# Patient Record
Sex: FEMALE | Race: WHITE | NOT HISPANIC OR LATINO | URBAN - NONMETROPOLITAN AREA
[De-identification: names, ages, dates, MRNs, and addresses within clinical notes are randomized per-mention and may not be internally consistent; named-entity substitution may affect disease eponyms.]

---

## 2023-10-11 DIAGNOSIS — J45.20 MILD INTERMITTENT ASTHMA WITHOUT COMPLICATION (HHS-HCC): Primary | ICD-10-CM

## 2023-10-11 RX ORDER — ALBUTEROL SULFATE 90 UG/1
2 AEROSOL, METERED RESPIRATORY (INHALATION) EVERY 4 HOURS PRN
Qty: 6.7 G | Refills: 0 | Status: SHIPPED | OUTPATIENT
Start: 2023-10-11 | End: 2023-11-30

## 2023-11-09 PROBLEM — I82.409 DEEP VENOUS THROMBOSIS (MULTI): Status: ACTIVE | Noted: 2023-11-09

## 2023-11-09 PROBLEM — M62.82 RHABDOMYOLYSIS: Status: ACTIVE | Noted: 2023-11-09

## 2023-11-09 PROBLEM — R78.81 BACTEREMIA DUE TO METHICILLIN RESISTANT STAPHYLOCOCCUS AUREUS: Status: ACTIVE | Noted: 2023-11-09

## 2023-11-09 PROBLEM — G25.3 MYOCLONUS: Status: ACTIVE | Noted: 2023-11-09

## 2023-11-09 PROBLEM — K21.9 GASTROESOPHAGEAL REFLUX DISEASE: Status: ACTIVE | Noted: 2023-11-09

## 2023-11-09 PROBLEM — B95.62 BACTEREMIA DUE TO METHICILLIN RESISTANT STAPHYLOCOCCUS AUREUS: Status: ACTIVE | Noted: 2023-11-09

## 2023-11-09 PROBLEM — J15.212 MRSA PNEUMONIA (MULTI): Status: ACTIVE | Noted: 2023-11-09

## 2023-11-09 PROBLEM — K22.70 BARRETT ESOPHAGUS: Status: ACTIVE | Noted: 2023-11-09

## 2023-11-09 PROBLEM — R41.89 COGNITIVE IMPAIRMENT: Status: ACTIVE | Noted: 2023-11-09

## 2023-11-09 PROBLEM — E55.9 VITAMIN D DEFICIENCY, UNSPECIFIED: Status: ACTIVE | Noted: 2023-11-09

## 2023-11-09 PROBLEM — G57.93 NEUROPATHY OF BOTH FEET: Status: ACTIVE | Noted: 2023-11-09

## 2023-11-09 RX ORDER — DULOXETIN HYDROCHLORIDE 60 MG/1
CAPSULE, DELAYED RELEASE ORAL
COMMUNITY
Start: 2023-10-11 | End: 2023-11-10 | Stop reason: ALTCHOICE

## 2023-11-09 RX ORDER — MIRTAZAPINE 7.5 MG/1
TABLET, FILM COATED ORAL
COMMUNITY
Start: 2023-10-02 | End: 2023-11-10 | Stop reason: ALTCHOICE

## 2023-11-09 RX ORDER — DIVALPROEX SODIUM 250 MG/1
1 TABLET, FILM COATED, EXTENDED RELEASE ORAL DAILY
COMMUNITY
End: 2023-11-10 | Stop reason: ALTCHOICE

## 2023-11-09 RX ORDER — PANTOPRAZOLE SODIUM 40 MG/1
1 TABLET, DELAYED RELEASE ORAL DAILY
COMMUNITY

## 2023-11-09 RX ORDER — TEMAZEPAM 7.5 MG/1
1 CAPSULE ORAL DAILY PRN
COMMUNITY
Start: 2023-09-01 | End: 2023-11-10 | Stop reason: ALTCHOICE

## 2023-11-09 RX ORDER — PRAZOSIN HYDROCHLORIDE 1 MG/1
1 CAPSULE ORAL NIGHTLY
COMMUNITY
Start: 2023-07-29 | End: 2023-11-10

## 2023-11-09 RX ORDER — OLANZAPINE 5 MG/1
2 TABLET ORAL NIGHTLY
COMMUNITY
Start: 2023-09-20 | End: 2023-12-06 | Stop reason: ALTCHOICE

## 2023-11-09 RX ORDER — DULOXETIN HYDROCHLORIDE 30 MG/1
1 CAPSULE, DELAYED RELEASE ORAL DAILY
COMMUNITY
Start: 2023-08-21 | End: 2023-12-06 | Stop reason: ALTCHOICE

## 2023-11-09 RX ORDER — DICYCLOMINE HYDROCHLORIDE 10 MG/1
1 CAPSULE ORAL 3 TIMES DAILY
COMMUNITY
Start: 2022-04-04 | End: 2023-12-18 | Stop reason: WASHOUT

## 2023-11-09 RX ORDER — LANOLIN ALCOHOL/MO/W.PET/CERES
1 CREAM (GRAM) TOPICAL DAILY
COMMUNITY
Start: 2023-05-05 | End: 2023-12-06 | Stop reason: ALTCHOICE

## 2023-11-09 RX ORDER — TEMAZEPAM 15 MG/1
1 CAPSULE ORAL AS NEEDED
COMMUNITY
Start: 2023-01-27 | End: 2023-12-06 | Stop reason: ALTCHOICE

## 2023-11-09 RX ORDER — VITAMIN B COMPLEX
1200 CAPSULE ORAL SEE ADMIN INSTRUCTIONS
COMMUNITY
Start: 2022-09-12 | End: 2023-11-10 | Stop reason: WASHOUT

## 2023-11-10 ENCOUNTER — OFFICE VISIT (OUTPATIENT)
Dept: PRIMARY CARE | Facility: CLINIC | Age: 53
End: 2023-11-10
Payer: COMMERCIAL

## 2023-11-10 VITALS
HEART RATE: 84 BPM | BODY MASS INDEX: 26.01 KG/M2 | WEIGHT: 163.6 LBS | OXYGEN SATURATION: 97 % | SYSTOLIC BLOOD PRESSURE: 120 MMHG | DIASTOLIC BLOOD PRESSURE: 74 MMHG

## 2023-11-10 DIAGNOSIS — Z79.899 OTHER LONG TERM (CURRENT) DRUG THERAPY: ICD-10-CM

## 2023-11-10 DIAGNOSIS — R63.5 WEIGHT GAIN: ICD-10-CM

## 2023-11-10 DIAGNOSIS — R63.2 POLYPHAGIA: Primary | ICD-10-CM

## 2023-11-10 DIAGNOSIS — D17.21 LIPOMA OF RIGHT UPPER EXTREMITY: ICD-10-CM

## 2023-11-10 LAB — POC HEMOGLOBIN A1C: 5.4 % (ref 4.2–6.5)

## 2023-11-10 PROCEDURE — 99214 OFFICE O/P EST MOD 30 MIN: CPT | Performed by: FAMILY MEDICINE

## 2023-11-10 RX ORDER — MELATONIN 5 MG
5 CAPSULE ORAL EVERY 24 HOURS
COMMUNITY

## 2023-11-10 ASSESSMENT — ENCOUNTER SYMPTOMS
LOSS OF SENSATION IN FEET: 0
OCCASIONAL FEELINGS OF UNSTEADINESS: 0
DEPRESSION: 1

## 2023-11-10 ASSESSMENT — PATIENT HEALTH QUESTIONNAIRE - PHQ9
1. LITTLE INTEREST OR PLEASURE IN DOING THINGS: NEARLY EVERY DAY
SUM OF ALL RESPONSES TO PHQ9 QUESTIONS 1 AND 2: 6
6. FEELING BAD ABOUT YOURSELF - OR THAT YOU ARE A FAILURE OR HAVE LET YOURSELF OR YOUR FAMILY DOWN: SEVERAL DAYS
9. THOUGHTS THAT YOU WOULD BE BETTER OFF DEAD, OR OF HURTING YOURSELF: NOT AT ALL
7. TROUBLE CONCENTRATING ON THINGS, SUCH AS READING THE NEWSPAPER OR WATCHING TELEVISION: NEARLY EVERY DAY
8. MOVING OR SPEAKING SO SLOWLY THAT OTHER PEOPLE COULD HAVE NOTICED. OR THE OPPOSITE, BEING SO FIGETY OR RESTLESS THAT YOU HAVE BEEN MOVING AROUND A LOT MORE THAN USUAL: NOT AT ALL
10. IF YOU CHECKED OFF ANY PROBLEMS, HOW DIFFICULT HAVE THESE PROBLEMS MADE IT FOR YOU TO DO YOUR WORK, TAKE CARE OF THINGS AT HOME, OR GET ALONG WITH OTHER PEOPLE: SOMEWHAT DIFFICULT
5. POOR APPETITE OR OVEREATING: SEVERAL DAYS
4. FEELING TIRED OR HAVING LITTLE ENERGY: SEVERAL DAYS
SUM OF ALL RESPONSES TO PHQ QUESTIONS 1-9: 15
2. FEELING DOWN, DEPRESSED OR HOPELESS: NEARLY EVERY DAY
3. TROUBLE FALLING OR STAYING ASLEEP OR SLEEPING TOO MUCH: NEARLY EVERY DAY

## 2023-11-10 ASSESSMENT — PAIN SCALES - GENERAL: PAINLEVEL: 0-NO PAIN

## 2023-11-10 NOTE — PROGRESS NOTES
Subjective   Patient ID: Caryn Streeter is a 53 y.o. female who presents for Bumps on arms.    With lumps on her arms that she worries about.  Started mostly on her right side but she found some on her left side as well.'s been going on over the last few months.  Increased hunger now... Used to avoid sugar, but can't seem to stop. On zyprexa  now and tamazepam.  She has told her psychiatrist about this but they have kept her on this.  She is gaining weight where she never has had a problem with that before.  She never had a problem with cravings for sugar and other foods but does now.  Not sure what to do with that.  She has had friends have who have been on GLP-1 medications and she wonders if she should start 1 of those, especially if she was diabetic.    He states the voices in her head are better on the medication.  They are still there but she is able to handle it better.         Review of Systems    Objective   /74   Pulse 84   Wt 74.2 kg (163 lb 9.6 oz)   SpO2 97%   BMI 26.01 kg/m²     Physical Exam  Constitutional:       General: She is not in acute distress.     Appearance: Normal appearance. She is not ill-appearing.   Abdominal:      General: Abdomen is flat. There is distension (Abdomen is larger than it has been.).      Palpations: Abdomen is soft.   Skin:     General: Skin is warm and dry.      Comments: She has a few nodules beneath her skin and seems to be lipomas or ganglion cyst.  She has 1 on her left wrist on the radial anterior side and then 1 up to her forearm as well.  I do not feel any left side but she states she is felt in there before.   Neurological:      Mental Status: She is alert.   Psychiatric:         Mood and Affect: Mood normal.         Assessment/Plan   Diagnoses and all orders for this visit:  Polyphagia  -     POCT glycosylated hemoglobin (Hb A1C) manually resulted  Weight gain  -     POCT glycosylated hemoglobin (Hb A1C) manually resulted  Lipoma of right upper  extremity  -     POCT glycosylated hemoglobin (Hb A1C) manually resulted  Other long term (current) drug therapy  -     POCT glycosylated hemoglobin (Hb A1C) manually resulted    This is likely a lipoma or ganglion cyst that she has.  But we did macario this from a TCM standpoint.  Her energy is stuck.  Is one of the reason she is gaining weight as well.  I did offer her an acupuncture treatment today we will see how she does over the weekend.  In the meantime, however, I want her to call her psychiatrist and change her off of the Zyprexa.  Since has been on that she has had these hunger issues.  I do not recommend any GLP-1 medications but rather stopping the thing that is precipitated this.    She will call me on Monday and let me know how she is done.

## 2023-11-15 ENCOUNTER — TELEPHONE (OUTPATIENT)
Dept: PRIMARY CARE | Facility: CLINIC | Age: 53
End: 2023-11-15
Payer: COMMERCIAL

## 2023-11-15 NOTE — TELEPHONE ENCOUNTER
Her psych doc stopped her olanzepine.  They are going to start her on asenapine maleate. She wants to know if this is okay for her to take. Last appt with you 11/10/23 When calling back call home phone [phone] and okay to leave message

## 2023-11-30 DIAGNOSIS — J45.20 MILD INTERMITTENT ASTHMA WITHOUT COMPLICATION (HHS-HCC): ICD-10-CM

## 2023-11-30 RX ORDER — ALBUTEROL SULFATE 90 UG/1
2 AEROSOL, METERED RESPIRATORY (INHALATION) EVERY 4 HOURS PRN
Qty: 6.7 G | Refills: 0 | Status: SHIPPED | OUTPATIENT
Start: 2023-11-30

## 2023-12-06 ENCOUNTER — OFFICE VISIT (OUTPATIENT)
Dept: PRIMARY CARE | Facility: CLINIC | Age: 53
End: 2023-12-06
Payer: COMMERCIAL

## 2023-12-06 VITALS
BODY MASS INDEX: 25.5 KG/M2 | WEIGHT: 160.4 LBS | HEART RATE: 83 BPM | SYSTOLIC BLOOD PRESSURE: 118 MMHG | OXYGEN SATURATION: 97 % | DIASTOLIC BLOOD PRESSURE: 72 MMHG

## 2023-12-06 DIAGNOSIS — R63.5 WEIGHT GAIN: ICD-10-CM

## 2023-12-06 DIAGNOSIS — R44.0 HEARING VOICES: Primary | ICD-10-CM

## 2023-12-06 PROCEDURE — 99213 OFFICE O/P EST LOW 20 MIN: CPT | Performed by: FAMILY MEDICINE

## 2023-12-06 RX ORDER — TEMAZEPAM 7.5 MG/1
7.5 CAPSULE ORAL NIGHTLY PRN
COMMUNITY
End: 2023-12-18 | Stop reason: SDUPTHER

## 2023-12-06 ASSESSMENT — PATIENT HEALTH QUESTIONNAIRE - PHQ9
2. FEELING DOWN, DEPRESSED OR HOPELESS: NOT AT ALL
1. LITTLE INTEREST OR PLEASURE IN DOING THINGS: NOT AT ALL
SUM OF ALL RESPONSES TO PHQ9 QUESTIONS 1 AND 2: 0

## 2023-12-06 ASSESSMENT — ENCOUNTER SYMPTOMS
LOSS OF SENSATION IN FEET: 0
DEPRESSION: 0
OCCASIONAL FEELINGS OF UNSTEADINESS: 0

## 2023-12-06 ASSESSMENT — PAIN SCALES - GENERAL: PAINLEVEL: 0-NO PAIN

## 2023-12-06 NOTE — PROGRESS NOTES
Subjective   Patient ID: Caryn Streeter is a 53 y.o. female who presents for Psych Meds.    She presents today to discuss treatments with me.  She states she had a bad reaction to latest psychiatric medication and she has been offered ECT treatment.  She wonders if that something she should do and just wants my opinion.    She gained so much weight on these other medications.  She is having some trouble losing it.  She also wants quit smoking.  She has a great desire to do so but is has had some trouble.         Review of Systems    Objective   /72   Pulse 83   Wt 72.8 kg (160 lb 6.4 oz)   SpO2 97%   BMI 25.50 kg/m²     Physical Exam  Constitutional:       General: She is not in acute distress.     Appearance: She is not ill-appearing.   Pulmonary:      Effort: Pulmonary effort is normal.   Neurological:      Mental Status: She is alert.   Psychiatric:         Mood and Affect: Mood normal.         Thought Content: Thought content normal.         Assessment/Plan   Diagnoses and all orders for this visit:  Hearing voices  Weight gain  I think that she certainly can go forward with the ECT treatment, specially since pharmaceuticals have not really served her well with the weight gain and other side effects and really are not working very well for her.  I do not know how well they will do for the voices but can help with anxiety and depression.    I will contact our pharmacist to see if Wegovy or Ozempic are covered for her, which could help her with the weight loss.

## 2023-12-06 NOTE — PATIENT INSTRUCTIONS
I think it's okay to do the ECT.     I'll check into the Wygovy and see if that is covered for weight loss.

## 2023-12-07 DIAGNOSIS — R63.5 WEIGHT GAIN: Primary | ICD-10-CM

## 2023-12-07 DIAGNOSIS — R63.2 POLYPHAGIA: ICD-10-CM

## 2023-12-18 ENCOUNTER — OFFICE VISIT (OUTPATIENT)
Dept: PRIMARY CARE | Facility: CLINIC | Age: 53
End: 2023-12-18
Payer: COMMERCIAL

## 2023-12-18 VITALS
BODY MASS INDEX: 25.39 KG/M2 | HEART RATE: 60 BPM | SYSTOLIC BLOOD PRESSURE: 120 MMHG | OXYGEN SATURATION: 98 % | DIASTOLIC BLOOD PRESSURE: 66 MMHG | HEIGHT: 66 IN | WEIGHT: 158 LBS

## 2023-12-18 DIAGNOSIS — R31.21 ASYMPTOMATIC MICROSCOPIC HEMATURIA: ICD-10-CM

## 2023-12-18 DIAGNOSIS — B37.9 YEAST INFECTION: ICD-10-CM

## 2023-12-18 DIAGNOSIS — R44.0 CONTINUOUS AUDITORY HALLUCINATIONS: Primary | ICD-10-CM

## 2023-12-18 PROBLEM — F17.200 NICOTINE USE DISORDER: Status: ACTIVE | Noted: 2021-07-29

## 2023-12-18 PROBLEM — K05.30 CHRONIC PERIODONTITIS: Status: ACTIVE | Noted: 2020-07-20

## 2023-12-18 PROBLEM — F31.5 BIPOLAR I DISORDER, MOST RECENT EPISODE (OR CURRENT) DEPRESSED, SEVERE, SPECIFIED AS WITH PSYCHOTIC BEHAVIOR (MULTI): Status: ACTIVE | Noted: 2023-05-03

## 2023-12-18 PROBLEM — F43.12 CHRONIC POST-TRAUMATIC STRESS DISORDER (PTSD): Status: ACTIVE | Noted: 2023-05-03

## 2023-12-18 PROBLEM — R41.3 MEMORY LOSS: Status: ACTIVE | Noted: 2021-10-25

## 2023-12-18 PROBLEM — F31.9 BIPOLAR 1 DISORDER (MULTI): Status: ACTIVE | Noted: 2023-12-18

## 2023-12-18 PROBLEM — F19.94 SUBSTANCE INDUCED MOOD DISORDER (MULTI): Status: ACTIVE | Noted: 2023-05-03

## 2023-12-18 PROBLEM — N95.1 PERIMENOPAUSE: Status: ACTIVE | Noted: 2023-12-18

## 2023-12-18 LAB
POC APPEARANCE, URINE: CLEAR
POC BILIRUBIN, URINE: NEGATIVE
POC BLOOD, URINE: ABNORMAL
POC COLOR, URINE: ABNORMAL
POC GLUCOSE, URINE: NEGATIVE MG/DL
POC KETONES, URINE: NEGATIVE MG/DL
POC LEUKOCYTES, URINE: NEGATIVE
POC NITRITE,URINE: NEGATIVE
POC PH, URINE: 7 PH
POC PROTEIN, URINE: NEGATIVE MG/DL
POC SPECIFIC GRAVITY, URINE: 1.02
POC UROBILINOGEN, URINE: 0.2 EU/DL

## 2023-12-18 PROCEDURE — 93010 ELECTROCARDIOGRAM REPORT: CPT | Performed by: FAMILY MEDICINE

## 2023-12-18 PROCEDURE — 99214 OFFICE O/P EST MOD 30 MIN: CPT | Performed by: FAMILY MEDICINE

## 2023-12-18 PROCEDURE — 93005 ELECTROCARDIOGRAM TRACING: CPT | Performed by: FAMILY MEDICINE

## 2023-12-18 RX ORDER — FLUCONAZOLE 150 MG/1
150 TABLET ORAL ONCE
Qty: 1 TABLET | Refills: 0 | Status: SHIPPED | OUTPATIENT
Start: 2023-12-18 | End: 2023-12-18

## 2023-12-18 RX ORDER — TEMAZEPAM 15 MG/1
CAPSULE ORAL
COMMUNITY
Start: 2023-12-15

## 2023-12-18 ASSESSMENT — PATIENT HEALTH QUESTIONNAIRE - PHQ9
2. FEELING DOWN, DEPRESSED OR HOPELESS: NOT AT ALL
SUM OF ALL RESPONSES TO PHQ9 QUESTIONS 1 AND 2: 0
1. LITTLE INTEREST OR PLEASURE IN DOING THINGS: NOT AT ALL

## 2023-12-18 ASSESSMENT — ENCOUNTER SYMPTOMS
DEPRESSION: 0
OCCASIONAL FEELINGS OF UNSTEADINESS: 0
LOSS OF SENSATION IN FEET: 0

## 2023-12-18 ASSESSMENT — PAIN SCALES - GENERAL: PAINLEVEL: 0-NO PAIN

## 2023-12-18 NOTE — PROGRESS NOTES
"Subjective   Patient ID: Caryn Streeter is a 53 y.o. female who presents for Follow-up (Kelsi requesting EKG and review UA).    HPI   She presents here to have an EKG performed.  She is going to be undergoing ECT therapy and I assume this is part of the workup before that.  Currently just on the Depakote and temazepam.  She did have a UA at the [facility], which results she showed me on her phone.  Was positive for blood, leukocytes and had budding yeast noted.  We repeated that today          Review of Systems    Objective   /66   Pulse 60   Ht 1.676 m (5' 6\")   Wt 71.7 kg (158 lb)   SpO2 98%   BMI 25.50 kg/m²     Physical Exam  Constitutional:       Appearance: She is normal weight. She is not ill-appearing.   Cardiovascular:      Rate and Rhythm: Normal rate and regular rhythm.   Pulmonary:      Effort: Pulmonary effort is normal.   Neurological:      Mental Status: She is alert.   Psychiatric:         Mood and Affect: Mood normal.       Assessment/Plan   Diagnoses and all orders for this visit:  Continuous auditory hallucinations  Asymptomatic microscopic hematuria  -     POCT UA (nonautomated) manually resulted  -     Urinalysis with Reflex Microscopic and Culture; Future  Yeast infection  -     fluconazole (Diflucan) 150 mg tablet; Take 1 tablet (150 mg) by mouth 1 time for 1 dose.  -     Urinalysis with Reflex Microscopic and Culture; Future  Her EKG is normal.  She will discuss this with her psychiatrist as she goes forward with ECT.    She did have budding yeast on the urinalysis that had leukocytes and blood.  Will treat with Diflucan x 1 and then recheck the urine to make sure all these blood is resolved.  She just had a trace here today.       "

## 2023-12-18 NOTE — PATIENT INSTRUCTIONS
Your EKG is done and looks normal.  He did have still a small amount of blood in the urine.  He did have budding yeast shown on the culture so I recommend taking Diflucan 150 mg 1 dose and then recheck a urine test again in a week at the lab.

## 2023-12-19 LAB — HOLD SPECIMEN: NORMAL

## 2023-12-20 ENCOUNTER — TELEPHONE (OUTPATIENT)
Dept: PRIMARY CARE | Facility: CLINIC | Age: 53
End: 2023-12-20
Payer: COMMERCIAL

## 2023-12-27 ENCOUNTER — TELEPHONE (OUTPATIENT)
Dept: PRIMARY CARE | Facility: CLINIC | Age: 53
End: 2023-12-27
Payer: COMMERCIAL

## 2023-12-27 DIAGNOSIS — R39.9 UTI SYMPTOMS: Primary | ICD-10-CM

## 2023-12-27 NOTE — TELEPHONE ENCOUNTER
Patient was given fluconazole. She thought she had a uti. She is still having frequent urination and low back pain.

## 2023-12-29 ENCOUNTER — LAB (OUTPATIENT)
Dept: LAB | Facility: LAB | Age: 53
End: 2023-12-29
Payer: COMMERCIAL

## 2023-12-29 DIAGNOSIS — R39.9 UTI SYMPTOMS: ICD-10-CM

## 2023-12-29 DIAGNOSIS — R31.21 ASYMPTOMATIC MICROSCOPIC HEMATURIA: Primary | ICD-10-CM

## 2023-12-29 LAB
APPEARANCE UR: CLEAR
BACTERIA #/AREA URNS AUTO: ABNORMAL /HPF
BILIRUB UR STRIP.AUTO-MCNC: NEGATIVE MG/DL
COLOR UR: YELLOW
GLUCOSE UR STRIP.AUTO-MCNC: NEGATIVE MG/DL
HOLD SPECIMEN: NORMAL
KETONES UR STRIP.AUTO-MCNC: NEGATIVE MG/DL
LEUKOCYTE ESTERASE UR QL STRIP.AUTO: NEGATIVE
MUCOUS THREADS #/AREA URNS AUTO: ABNORMAL /LPF
NITRITE UR QL STRIP.AUTO: NEGATIVE
PH UR STRIP.AUTO: 6 [PH]
PROT UR STRIP.AUTO-MCNC: NEGATIVE MG/DL
RBC # UR STRIP.AUTO: ABNORMAL /UL
RBC #/AREA URNS AUTO: ABNORMAL /HPF
SP GR UR STRIP.AUTO: 1.01
UROBILINOGEN UR STRIP.AUTO-MCNC: <2 MG/DL
WBC #/AREA URNS AUTO: ABNORMAL /HPF

## 2024-01-05 ENCOUNTER — LAB (OUTPATIENT)
Dept: LAB | Facility: LAB | Age: 54
End: 2024-01-05
Payer: COMMERCIAL

## 2024-01-05 DIAGNOSIS — R31.21 ASYMPTOMATIC MICROSCOPIC HEMATURIA: ICD-10-CM

## 2024-01-05 LAB
APPEARANCE UR: CLEAR
BILIRUB UR STRIP.AUTO-MCNC: NEGATIVE MG/DL
COLOR UR: YELLOW
GLUCOSE UR STRIP.AUTO-MCNC: NEGATIVE MG/DL
HYALINE CASTS #/AREA URNS AUTO: ABNORMAL /LPF
KETONES UR STRIP.AUTO-MCNC: NEGATIVE MG/DL
LEUKOCYTE ESTERASE UR QL STRIP.AUTO: NEGATIVE
NITRITE UR QL STRIP.AUTO: NEGATIVE
PH UR STRIP.AUTO: 6 [PH]
PROT UR STRIP.AUTO-MCNC: NEGATIVE MG/DL
RBC # UR STRIP.AUTO: ABNORMAL /UL
RBC #/AREA URNS AUTO: ABNORMAL /HPF
SP GR UR STRIP.AUTO: 1.02
SQUAMOUS #/AREA URNS AUTO: ABNORMAL /HPF
UROBILINOGEN UR STRIP.AUTO-MCNC: 2 MG/DL
WBC #/AREA URNS AUTO: ABNORMAL /HPF

## 2024-01-06 LAB — HOLD SPECIMEN: NORMAL

## 2024-01-29 ENCOUNTER — APPOINTMENT (OUTPATIENT)
Dept: PRIMARY CARE | Facility: CLINIC | Age: 54
End: 2024-01-29
Payer: COMMERCIAL

## 2024-03-11 ENCOUNTER — HOSPITAL ENCOUNTER (OUTPATIENT)
Dept: RADIOLOGY | Facility: HOSPITAL | Age: 54
Discharge: HOME | End: 2024-03-11
Payer: COMMERCIAL

## 2024-03-11 DIAGNOSIS — Z12.31 ENCOUNTER FOR SCREENING MAMMOGRAM FOR MALIGNANT NEOPLASM OF BREAST: ICD-10-CM

## 2024-03-11 PROCEDURE — 77063 BREAST TOMOSYNTHESIS BI: CPT | Performed by: RADIOLOGY

## 2024-03-11 PROCEDURE — 77067 SCR MAMMO BI INCL CAD: CPT | Performed by: RADIOLOGY

## 2024-03-11 PROCEDURE — 77067 SCR MAMMO BI INCL CAD: CPT

## 2024-04-09 ENCOUNTER — APPOINTMENT (OUTPATIENT)
Dept: CARDIOLOGY | Facility: HOSPITAL | Age: 54
End: 2024-04-09
Payer: COMMERCIAL

## 2024-04-09 ENCOUNTER — HOSPITAL ENCOUNTER (EMERGENCY)
Facility: HOSPITAL | Age: 54
Discharge: HOME | End: 2024-04-09
Payer: COMMERCIAL

## 2024-04-09 ENCOUNTER — TELEPHONE (OUTPATIENT)
Dept: PRIMARY CARE | Facility: CLINIC | Age: 54
End: 2024-04-09
Payer: COMMERCIAL

## 2024-04-09 ENCOUNTER — APPOINTMENT (OUTPATIENT)
Dept: RADIOLOGY | Facility: HOSPITAL | Age: 54
End: 2024-04-09
Payer: COMMERCIAL

## 2024-04-09 VITALS
SYSTOLIC BLOOD PRESSURE: 107 MMHG | HEART RATE: 88 BPM | BODY MASS INDEX: 22.5 KG/M2 | DIASTOLIC BLOOD PRESSURE: 77 MMHG | WEIGHT: 140 LBS | HEIGHT: 66 IN | RESPIRATION RATE: 16 BRPM | TEMPERATURE: 98.5 F | OXYGEN SATURATION: 96 %

## 2024-04-09 DIAGNOSIS — R55 NEAR SYNCOPE: Primary | ICD-10-CM

## 2024-04-09 LAB
ALBUMIN SERPL BCP-MCNC: 5 G/DL (ref 3.4–5)
ALP SERPL-CCNC: 63 U/L (ref 33–110)
ALT SERPL W P-5'-P-CCNC: 4 U/L (ref 7–45)
ANION GAP SERPL CALC-SCNC: 11 MMOL/L (ref 10–20)
APPEARANCE UR: CLEAR
AST SERPL W P-5'-P-CCNC: 14 U/L (ref 9–39)
B-HCG SERPL-ACNC: 3 MIU/ML
BASOPHILS # BLD AUTO: 0.05 X10*3/UL (ref 0–0.1)
BASOPHILS NFR BLD AUTO: 0.6 %
BILIRUB SERPL-MCNC: 0.4 MG/DL (ref 0–1.2)
BILIRUB UR STRIP.AUTO-MCNC: NEGATIVE MG/DL
BUN SERPL-MCNC: 13 MG/DL (ref 6–23)
CALCIUM SERPL-MCNC: 10.2 MG/DL (ref 8.6–10.3)
CARDIAC TROPONIN I PNL SERPL HS: 4 NG/L (ref 0–13)
CHLORIDE SERPL-SCNC: 101 MMOL/L (ref 98–107)
CO2 SERPL-SCNC: 32 MMOL/L (ref 21–32)
COLOR UR: ABNORMAL
CREAT SERPL-MCNC: 0.96 MG/DL (ref 0.5–1.05)
EGFRCR SERPLBLD CKD-EPI 2021: 71 ML/MIN/1.73M*2
EOSINOPHIL # BLD AUTO: 0.09 X10*3/UL (ref 0–0.7)
EOSINOPHIL NFR BLD AUTO: 1 %
ERYTHROCYTE [DISTWIDTH] IN BLOOD BY AUTOMATED COUNT: 13.3 % (ref 11.5–14.5)
GLUCOSE SERPL-MCNC: 93 MG/DL (ref 74–99)
GLUCOSE UR STRIP.AUTO-MCNC: NEGATIVE MG/DL
HCT VFR BLD AUTO: 42.2 % (ref 36–46)
HGB BLD-MCNC: 13.5 G/DL (ref 12–16)
IMM GRANULOCYTES # BLD AUTO: 0.03 X10*3/UL (ref 0–0.7)
IMM GRANULOCYTES NFR BLD AUTO: 0.3 % (ref 0–0.9)
KETONES UR STRIP.AUTO-MCNC: NEGATIVE MG/DL
LEUKOCYTE ESTERASE UR QL STRIP.AUTO: NEGATIVE
LYMPHOCYTES # BLD AUTO: 2.44 X10*3/UL (ref 1.2–4.8)
LYMPHOCYTES NFR BLD AUTO: 27.3 %
MCH RBC QN AUTO: 29.2 PG (ref 26–34)
MCHC RBC AUTO-ENTMCNC: 32 G/DL (ref 32–36)
MCV RBC AUTO: 91 FL (ref 80–100)
MONOCYTES # BLD AUTO: 0.54 X10*3/UL (ref 0.1–1)
MONOCYTES NFR BLD AUTO: 6 %
NEUTROPHILS # BLD AUTO: 5.79 X10*3/UL (ref 1.2–7.7)
NEUTROPHILS NFR BLD AUTO: 64.8 %
NITRITE UR QL STRIP.AUTO: NEGATIVE
NRBC BLD-RTO: 0 /100 WBCS (ref 0–0)
PH UR STRIP.AUTO: 7 [PH]
PLATELET # BLD AUTO: 268 X10*3/UL (ref 150–450)
POTASSIUM SERPL-SCNC: 4 MMOL/L (ref 3.5–5.3)
PROT SERPL-MCNC: 7.6 G/DL (ref 6.4–8.2)
PROT UR STRIP.AUTO-MCNC: NEGATIVE MG/DL
RBC # BLD AUTO: 4.62 X10*6/UL (ref 4–5.2)
RBC # UR STRIP.AUTO: NEGATIVE /UL
SODIUM SERPL-SCNC: 140 MMOL/L (ref 136–145)
SP GR UR STRIP.AUTO: 1
UROBILINOGEN UR STRIP.AUTO-MCNC: <2 MG/DL
WBC # BLD AUTO: 8.9 X10*3/UL (ref 4.4–11.3)

## 2024-04-09 PROCEDURE — 84484 ASSAY OF TROPONIN QUANT: CPT | Performed by: PHYSICIAN ASSISTANT

## 2024-04-09 PROCEDURE — 70450 CT HEAD/BRAIN W/O DYE: CPT

## 2024-04-09 PROCEDURE — 80053 COMPREHEN METABOLIC PANEL: CPT | Performed by: PHYSICIAN ASSISTANT

## 2024-04-09 PROCEDURE — 36415 COLL VENOUS BLD VENIPUNCTURE: CPT | Performed by: PHYSICIAN ASSISTANT

## 2024-04-09 PROCEDURE — 81003 URINALYSIS AUTO W/O SCOPE: CPT | Performed by: PHYSICIAN ASSISTANT

## 2024-04-09 PROCEDURE — 93005 ELECTROCARDIOGRAM TRACING: CPT

## 2024-04-09 PROCEDURE — 99285 EMERGENCY DEPT VISIT HI MDM: CPT | Mod: 25

## 2024-04-09 PROCEDURE — 70450 CT HEAD/BRAIN W/O DYE: CPT | Performed by: RADIOLOGY

## 2024-04-09 PROCEDURE — 84702 CHORIONIC GONADOTROPIN TEST: CPT | Performed by: PHYSICIAN ASSISTANT

## 2024-04-09 PROCEDURE — 93005 ELECTROCARDIOGRAM TRACING: CPT | Mod: 76

## 2024-04-09 PROCEDURE — 85025 COMPLETE CBC W/AUTO DIFF WBC: CPT | Performed by: PHYSICIAN ASSISTANT

## 2024-04-09 ASSESSMENT — PAIN - FUNCTIONAL ASSESSMENT: PAIN_FUNCTIONAL_ASSESSMENT: 0-10

## 2024-04-09 ASSESSMENT — COLUMBIA-SUICIDE SEVERITY RATING SCALE - C-SSRS
6. HAVE YOU EVER DONE ANYTHING, STARTED TO DO ANYTHING, OR PREPARED TO DO ANYTHING TO END YOUR LIFE?: NO
2. HAVE YOU ACTUALLY HAD ANY THOUGHTS OF KILLING YOURSELF?: NO
1. IN THE PAST MONTH, HAVE YOU WISHED YOU WERE DEAD OR WISHED YOU COULD GO TO SLEEP AND NOT WAKE UP?: NO

## 2024-04-09 ASSESSMENT — PAIN SCALES - GENERAL: PAINLEVEL_OUTOF10: 0 - NO PAIN

## 2024-04-09 NOTE — TELEPHONE ENCOUNTER
Patient called to report after recent psych medication change on 04/07/2024, patient has had an increase in episodes of syncope, Patient reports have 2 episodes  syncope with loss of consciousness since 04/07/2024. Patient reports psychiatrist is aware of side effects of current medications . Patient started ECT treaments 6 weeks ago and would like to know if she should see PCP or go to ED for evaluation of syncope.

## 2024-04-10 NOTE — ED PROVIDER NOTES
HPI   Chief Complaint   Patient presents with    Dizziness     Pt presents with c/o intermittent dizziness for the past week, states she recently started ECT treatment and has had multiply psych medication changes       History of present illness:  54-year-old female presents to the emergency room for complaints of intermittent episodes of dizziness   And near syncope.  The patient states that she has been under a great deal of stress recently and states that she has been on multiple antidepressant medications.  She states that she has been struggling with anxiety and states that she recently started electroconvulsive therapy.  She states that seems to be helping with some of her symptoms but she states that since she has been doing that she has been having episodes of what she believes is near syncope or possible dizziness.  She describes the symptoms stating that sometimes it feels like she is getting very lightheaded and she will begin developing tunnel vision or seeing black spots in her vision and then will get very weak and feel like she is going to fall over.  She states that sometimes when she sits down she feels much better.  She states that she does not have any symptoms at this time but is concerned about this.  She has a past medical history of bipolar disorder as well as depression and osteomyelitis of the lumbar spine.  She denies any other symptoms at this time.    Social history: Negative for alcohol and drug use.    Review of systems:   Gen.: No weight loss, fatigue, anorexia, insomnia, fever.   Eyes: No vision loss, double vision  ENT: No pharyngitis, neck pain, headache   Cardiac: No chest pain, palpitations  Pulmonary: No shortness of breath, cough, hemoptysis.   Heme/lymph: No swollen glands, fever, bleeding.   GI: No abdominal pain, change in bowel habits, melena, hematemesis, hematochezia, nausea, vomiting, diarrhea.   : No discharge, dysuria, frequency, urgency, hematuria.    Musculoskeletal: No limb pain, joint pain, joint swelling.   Skin: No rashes.   Review of systems is otherwise negative unless stated above or in history of present illness.        Physical exam:  General: Vitals noted, no distress. Afebrile.   EENT: Tympanic membranes bilaterally unremarkable, no lymphadenopathy appreciated, posterior pharynx unremarkable  Cardiac: Regular, rate, rhythm, no murmur.   Pulmonary: Lungs clear bilaterally with good aeration. No adventitious breath sounds.   Abdomen: Soft, nonsurgical. Nontender. No peritoneal signs. Normoactive bowel sounds.   Extremities: No peripheral edema.   Skin: No rash.   Neuro: No focal neurologic deficits        Medical decision making:   Testing: CBC CMP troponin x 2, urinalysis: No acute findings EKG as listed below is unremarkable, orthostatics unremarkable      EKG taken on April 9, 2024 at 1451 shows normal sinus rhythm at 80 normal axis normal rate no T wave inversion no Q waves as interpreted myself      Plan: Home-going.  Discussed differential. Will follow-up with the primary physician in the next 2-3 days. Return if worse. They understand return precautions and discharge instructions. Patient and family/friend/caregiver are in agreement with this plan. 54-year-old female presents to the emergency room for complaints of intermittent episodes of dizziness   And near syncope.  The patient states that she has been under a great deal of stress recently and states that she has been on multiple antidepressant medications.  She states that she has been struggling with anxiety and states that she recently started electroconvulsive therapy.  She states that seems to be helping with some of her symptoms but she states that since she has been doing that she has been having episodes of what she believes is near syncope or possible dizziness.  She describes the symptoms stating that sometimes it feels like she is getting very lightheaded and she will begin  developing tunnel vision or seeing black spots in her vision and then will get very weak and feel like she is going to fall over.  She states that sometimes when she sits down she feels much better.  She states that she does not have any symptoms at this time but is concerned about this.  She has a past medical history of bipolar disorder as well as depression and osteomyelitis of the lumbar spine.  She denies any other symptoms at this time.  On physical exam patient has normal S1-S2 heart sounds normal orthostatic testing was negative, lungs are clear to auscultation, no obvious neurological deficits could be appreciated the patient has been acting appropriately moving all 4 extremities and able ambulate to the bathroom with no difficulty.  I spoke with the patient bedside concerning her test results and explained to her that I am unsure the cause of her symptoms and her recurrent bouts of possible near syncope.  I explained to her that I do not believe is related to her ECT treatments at this time and explained to her that I have more concerns that there may be arrhythmia that is causing her symptoms.  I explained to her that be very important that she follow-up closely with cardiology and she agreed to contact them tomorrow morning she given a referral at this time.  I encouraged her to return the event she had any worsening symptoms.  I did offer admission to the patient at this time as well but she declined as she felt that she could just follow-up in outpatient and I was agreeable to this plan as well.  Impression:   1.  Near syncope    EKG taken on April 9, 2024 at 1451 shows normal sinus rhythm at 80 normal axis normal rate no T wave inversion no Q waves as interpreted myself          History provided by:  Patient   used: No                        [place] Coma Scale Score: 15                     Patient History   Past Medical History:   Diagnosis Date    Bipolar disorder, unspecified  (CMS/HCC) 06/04/2015    Bipolar disorder    Depression, unspecified 09/14/2022    Depression    Osteomyelitis of vertebra, lumbar region (CMS/HCC) 07/24/2015    Infection of lumbar spine    Personal history of other diseases of urinary system     History of kidney disease    Personal history of other specified conditions     History of chest pain    Pneumonia due to methicillin resistant Staphylococcus aureus (CMS/HCC) 04/30/2015    MRSA pneumonia    Unspecified asthma, uncomplicated     Uncomplicated asthma, unspecified asthma severity, unspecified whether persistent     Past Surgical History:   Procedure Laterality Date    BACK SURGERY  03/18/2015    Back Surgery    COLONOSCOPY  03/18/2015    Complete Colonoscopy    CT GUIDED IMAGING FOR NEEDLE PLACEMENT  12/20/2016    CT GUIDED IMAGING FOR NEEDLE PLACEMENT [facility]    ESOPHAGOGASTRODUODENOSCOPY  03/18/2015    Diagnostic Esophagogastroduodenoscopy    OTHER SURGICAL HISTORY  03/18/2015    Knee Arthroscopy With Medial And Lateral Meniscectomy    OTHER SURGICAL HISTORY  04/20/2021    Oral surgery     Family History   Problem Relation Name Age of Onset    Hyperlipidemia Mother      Hypertension Mother      No Known Problems Father      Asthma Sister      No Known Problems Brother      No Known Problems Son       Social History     Tobacco Use    Smoking status: Every Day     Packs/day: 0.50     Years: 30.00     Additional pack years: 0.00     Total pack years: 15.00     Types: Cigarettes    Smokeless tobacco: Never   Vaping Use    Vaping Use: Never used   Substance Use Topics    Alcohol use: Never    Drug use: Never       Physical Exam   ED Triage Vitals   Temperature Heart Rate Respirations BP   04/09/24 1442 04/09/24 1442 04/09/24 1442 04/09/24 1442   36.9 °C (98.4 °F) 83 18 106/73      Pulse Ox Temp Source Heart Rate Source Patient Position   04/09/24 1442 04/09/24 1442 04/09/24 1842 --   96 % Skin Monitor       BP Location FiO2 (%)     -- --              Physical Exam    ED Course & MDM   ED Course as of 04/09/24 2030 Tue Apr 09, 2024 1949 EKG done at 1928 which I independently reviewed and interpreted reveals a normal sinus rhythm at a rate of 66 nonischemic with normal intervals, normal axis and normal QT QTc.  No STEMI. [RM]      ED Course User Index  [RM] Brian Jorge MD         Diagnoses as of 04/09/24 2030   Near syncope       Medical Decision Making      Procedure  Procedures     Arben Doss PA-C  04/13/24 0932

## 2024-04-11 LAB
ATRIAL RATE: 80 BPM
P AXIS: 80 DEGREES
P OFFSET: 203 MS
P ONSET: 145 MS
PR INTERVAL: 164 MS
Q ONSET: 227 MS
QRS COUNT: 13 BEATS
QRS DURATION: 74 MS
QT INTERVAL: 372 MS
QTC CALCULATION(BAZETT): 429 MS
QTC FREDERICIA: 409 MS
R AXIS: -3 DEGREES
T AXIS: 70 DEGREES
T OFFSET: 413 MS
VENTRICULAR RATE: 80 BPM

## 2024-04-12 ENCOUNTER — OFFICE VISIT (OUTPATIENT)
Dept: PRIMARY CARE | Facility: CLINIC | Age: 54
End: 2024-04-12
Payer: COMMERCIAL

## 2024-04-12 VITALS
DIASTOLIC BLOOD PRESSURE: 74 MMHG | SYSTOLIC BLOOD PRESSURE: 112 MMHG | BODY MASS INDEX: 24.18 KG/M2 | HEART RATE: 94 BPM | OXYGEN SATURATION: 98 % | WEIGHT: 149.8 LBS

## 2024-04-12 DIAGNOSIS — R55 SYNCOPE, UNSPECIFIED SYNCOPE TYPE: Primary | ICD-10-CM

## 2024-04-12 DIAGNOSIS — F51.01 PRIMARY INSOMNIA: ICD-10-CM

## 2024-04-12 DIAGNOSIS — F31.5 BIPOLAR I DISORDER, MOST RECENT EPISODE (OR CURRENT) DEPRESSED, SEVERE, SPECIFIED AS WITH PSYCHOTIC BEHAVIOR (MULTI): ICD-10-CM

## 2024-04-12 PROCEDURE — 99214 OFFICE O/P EST MOD 30 MIN: CPT | Performed by: FAMILY MEDICINE

## 2024-04-12 RX ORDER — OXYBUTYNIN CHLORIDE 10 MG/1
10 TABLET, EXTENDED RELEASE ORAL DAILY
COMMUNITY
Start: 2024-02-15

## 2024-04-12 RX ORDER — DIVALPROEX SODIUM 125 MG/1
125 TABLET, DELAYED RELEASE ORAL 2 TIMES DAILY
COMMUNITY
Start: 2024-03-05

## 2024-04-12 RX ORDER — DIPHENHYDRAMINE HCL 25 MG
25 CAPSULE ORAL EVERY 6 HOURS PRN
COMMUNITY

## 2024-04-12 RX ORDER — OLANZAPINE 5 MG/1
5 TABLET ORAL NIGHTLY
COMMUNITY
Start: 2023-09-20 | End: 2024-04-12 | Stop reason: ALTCHOICE

## 2024-04-12 RX ORDER — DICYCLOMINE HYDROCHLORIDE 10 MG/1
10 CAPSULE ORAL 4 TIMES DAILY
COMMUNITY
Start: 2024-01-16

## 2024-04-12 RX ORDER — DULOXETIN HYDROCHLORIDE 30 MG/1
30 CAPSULE, DELAYED RELEASE ORAL DAILY
COMMUNITY
Start: 2024-03-29

## 2024-04-12 RX ORDER — FLUVOXAMINE MALEATE 25 MG/1
25 TABLET ORAL NIGHTLY
COMMUNITY
Start: 2024-03-29 | End: 2024-04-28

## 2024-04-12 ASSESSMENT — PATIENT HEALTH QUESTIONNAIRE - PHQ9
8. MOVING OR SPEAKING SO SLOWLY THAT OTHER PEOPLE COULD HAVE NOTICED. OR THE OPPOSITE, BEING SO FIGETY OR RESTLESS THAT YOU HAVE BEEN MOVING AROUND A LOT MORE THAN USUAL: NOT AT ALL
SUM OF ALL RESPONSES TO PHQ QUESTIONS 1-9: 20
3. TROUBLE FALLING OR STAYING ASLEEP OR SLEEPING TOO MUCH: NEARLY EVERY DAY
5. POOR APPETITE OR OVEREATING: NEARLY EVERY DAY
6. FEELING BAD ABOUT YOURSELF - OR THAT YOU ARE A FAILURE OR HAVE LET YOURSELF OR YOUR FAMILY DOWN: NEARLY EVERY DAY
2. FEELING DOWN, DEPRESSED OR HOPELESS: NEARLY EVERY DAY
SUM OF ALL RESPONSES TO PHQ9 QUESTIONS 1 AND 2: 6
9. THOUGHTS THAT YOU WOULD BE BETTER OFF DEAD, OR OF HURTING YOURSELF: NOT AT ALL
1. LITTLE INTEREST OR PLEASURE IN DOING THINGS: NEARLY EVERY DAY
7. TROUBLE CONCENTRATING ON THINGS, SUCH AS READING THE NEWSPAPER OR WATCHING TELEVISION: MORE THAN HALF THE DAYS
4. FEELING TIRED OR HAVING LITTLE ENERGY: NEARLY EVERY DAY
10. IF YOU CHECKED OFF ANY PROBLEMS, HOW DIFFICULT HAVE THESE PROBLEMS MADE IT FOR YOU TO DO YOUR WORK, TAKE CARE OF THINGS AT HOME, OR GET ALONG WITH OTHER PEOPLE: EXTREMELY DIFFICULT

## 2024-04-12 ASSESSMENT — ENCOUNTER SYMPTOMS
LOSS OF SENSATION IN FEET: 0
DEPRESSION: 0
OCCASIONAL FEELINGS OF UNSTEADINESS: 0

## 2024-04-12 ASSESSMENT — PAIN SCALES - GENERAL: PAINLEVEL: 3

## 2024-04-12 NOTE — PROGRESS NOTES
"Subjective   Patient ID: Caryn Streeter is a 54 y.o. female who presents for Hospital Discharge (Referral for Cardiology).    HPI   He presents today in follow-up from the ED visit.  She states that she has had 2 fainting spells.  She becomes somewhat lightheaded and then becomes weak in the knees and goes down.  The last 1, she was \"graying out\" and was falling down and grabbed the towel bar and pulled off the wall before she hit the ground.  She is not sure if she completely lost consciousness.    She has been having the ECT but then she put it off because she just realized she was getting IV and used to do IV drugs.  She wanted to make sure that she did not have any desire to redo that.  She states they will restart but now they want to workup this syncope issue first.    She states she has been doing very well, paying her bills on time and actually saving money despite being on disability and then recently has just been more depressed again.  They have changed of her psych medications.    She is also been having some insomnia.  She is on temazepam 15 mg that used to do well for her.  However, has not been working as well now and her psychiatrist wanted increased to 30 which she does not want to do that since she has had trouble with addiction in the past.  She is wondering if there is anything else she can do.  She was taking Chagani which was somewhat helpful for her.    Review of Systems    Objective   /74   Pulse 94   Wt 67.9 kg (149 lb 12.8 oz)   SpO2 98%   BMI 24.18 kg/m²     Physical Exam  Constitutional:       Appearance: Normal appearance. She is not ill-appearing.   Cardiovascular:      Rate and Rhythm: Normal rate and regular rhythm.   Pulmonary:      Effort: Pulmonary effort is normal.      Breath sounds: Normal breath sounds.   Skin:     Coloration: Skin is not jaundiced.   Neurological:      Mental Status: She is alert.   Psychiatric:         Mood and Affect: Mood normal. "         Assessment/Plan   Diagnoses and all orders for this visit:  Syncope, unspecified syncope type  -     Referral to Cardiology; Future  Bipolar I disorder, most recent episode (or current) depressed, severe, specified as with psychotic behavior (Multi)  Primary insomnia  Will have her use the night night tea in the evenings.  If not effective over the next 5 or 6 days, she will take it twice a day.  She will let me know how she does with that.    She will continue to work with her psychiatrist on her bipolar disorder and the voices.    I have referred her over to Dr. Estevez for evaluation of the syncope/near syncope.

## 2024-05-08 ENCOUNTER — TELEPHONE (OUTPATIENT)
Dept: PRIMARY CARE | Facility: CLINIC | Age: 54
End: 2024-05-08
Payer: COMMERCIAL

## 2024-05-08 NOTE — TELEPHONE ENCOUNTER
Pt is calling, she wants to know if a Functional MRI will help- she did see a Neurologist and was told to see Cardiologist- appt is next week- for her light headedness, and falls

## 2024-06-25 DIAGNOSIS — J45.20 MILD INTERMITTENT ASTHMA WITHOUT COMPLICATION (HHS-HCC): ICD-10-CM

## 2024-06-25 RX ORDER — ALBUTEROL SULFATE 90 UG/1
2 AEROSOL, METERED RESPIRATORY (INHALATION) EVERY 4 HOURS PRN
Qty: 6.7 G | Refills: 0 | Status: SHIPPED | OUTPATIENT
Start: 2024-06-25

## 2024-07-08 ENCOUNTER — OFFICE VISIT (OUTPATIENT)
Dept: PRIMARY CARE | Facility: CLINIC | Age: 54
End: 2024-07-08
Payer: COMMERCIAL

## 2024-07-08 VITALS
BODY MASS INDEX: 23.4 KG/M2 | WEIGHT: 145 LBS | DIASTOLIC BLOOD PRESSURE: 70 MMHG | HEART RATE: 84 BPM | SYSTOLIC BLOOD PRESSURE: 96 MMHG

## 2024-07-08 DIAGNOSIS — R42 DIZZINESS: ICD-10-CM

## 2024-07-08 DIAGNOSIS — R35.0 URINARY FREQUENCY: Primary | ICD-10-CM

## 2024-07-08 LAB
POC APPEARANCE, URINE: CLEAR
POC BILIRUBIN, URINE: NEGATIVE
POC BLOOD, URINE: NEGATIVE
POC COLOR, URINE: YELLOW
POC GLUCOSE, URINE: NEGATIVE MG/DL
POC KETONES, URINE: NEGATIVE MG/DL
POC LEUKOCYTES, URINE: NEGATIVE
POC NITRITE,URINE: NEGATIVE
POC PH, URINE: 7 PH
POC PROTEIN, URINE: NEGATIVE MG/DL
POC SPECIFIC GRAVITY, URINE: 1.01
POC UROBILINOGEN, URINE: 0.2 EU/DL

## 2024-07-08 PROCEDURE — 81002 URINALYSIS NONAUTO W/O SCOPE: CPT | Performed by: FAMILY MEDICINE

## 2024-07-08 PROCEDURE — 99214 OFFICE O/P EST MOD 30 MIN: CPT | Performed by: FAMILY MEDICINE

## 2024-07-08 RX ORDER — SYRINGE-NEEDLE,INSULIN,0.5 ML 28GX1/2"
1 SYRINGE, EMPTY DISPOSABLE MISCELLANEOUS
COMMUNITY
Start: 2023-10-31

## 2024-07-08 RX ORDER — CHLORPROMAZINE HYDROCHLORIDE 50 MG/1
50 TABLET, FILM COATED ORAL
COMMUNITY
Start: 2024-06-25 | End: 2024-07-25

## 2024-07-08 RX ORDER — CALCIUM/MAGNESIUM/ZINC 333-133-5
TABLET ORAL
COMMUNITY

## 2024-07-08 RX ORDER — VIT C/E/ZN/COPPR/LUTEIN/ZEAXAN 250MG-90MG
25 CAPSULE ORAL DAILY
COMMUNITY

## 2024-07-08 RX ORDER — BENZONATATE 100 MG/1
1 CAPSULE ORAL
COMMUNITY
Start: 2023-12-29

## 2024-07-08 RX ORDER — ESTRADIOL AND NORETHINDRONE ACETATE 1; .5 MG/1; MG/1
1 TABLET ORAL
COMMUNITY
Start: 2023-10-23

## 2024-07-08 RX ORDER — ZOLPIDEM TARTRATE 10 MG/1
10 TABLET ORAL NIGHTLY PRN
COMMUNITY
Start: 2024-04-23

## 2024-07-08 RX ORDER — CHROMIUM PICOLINATE 200 MCG
TABLET ORAL
COMMUNITY

## 2024-07-08 ASSESSMENT — PATIENT HEALTH QUESTIONNAIRE - PHQ9
1. LITTLE INTEREST OR PLEASURE IN DOING THINGS: NOT AT ALL
SUM OF ALL RESPONSES TO PHQ9 QUESTIONS 1 AND 2: 0
2. FEELING DOWN, DEPRESSED OR HOPELESS: NOT AT ALL

## 2024-07-08 ASSESSMENT — PAIN SCALES - GENERAL: PAINLEVEL: 0-NO PAIN

## 2024-07-08 NOTE — PATIENT INSTRUCTIONS
You urine is clear. You may schedule with urology for evaluation.   In the meantime, my use Kirby Dalila, 2 twice a day.     Continue to work with neurology on the dizziness.

## 2024-07-08 NOTE — PROGRESS NOTES
Subjective   Patient ID: Caryn Streeter is a 54 y.o. female who presents for Urinary Frequency and Dizziness.    HPI   She presents today for some urinary frequency that is going on for many weeks now.  No dysuria.    She also has been having some dizzy spells at times with some discomfort at the occipital area of her head released to recur.  She states this started after she had the ECT treatments.  She stopped the seizure treatments because they had to sedate her and they were injecting her arm put in the sedation and she just fell again heroin every a.m. and does not want a go down the road.      Urinalysis is negative today for any blood, glucose or any nitrates or leukocytes    Review of Systems    Objective   BP 96/70   Pulse 84   Wt 65.8 kg (145 lb)   BMI 23.40 kg/m²     Physical Exam  Alert, no apparent stress, her affect is pleasant.  Respirations are easy.  Heart with a regular rate and rhythm.  Normal gait and station.    Assessment/Plan   Diagnoses and all orders for this visit:  Urinary frequency  Dizziness  I recommend that she see a urologist for the urinary frequency.  Will also add the Kirby ling or twice a day.  Since she also has this discomfort at the top of her head when she is having dizziness this may be related to the bladder as well.  I rather see how she does on the early over the next week to 10 days for starting anything else.  She states she has had a workup for this at the ER including CAT scan for her head so we can see how she does with the year.

## 2024-07-16 ENCOUNTER — TELEPHONE (OUTPATIENT)
Dept: PRIMARY CARE | Facility: CLINIC | Age: 54
End: 2024-07-16
Payer: COMMERCIAL

## 2024-07-23 ENCOUNTER — OFFICE VISIT (OUTPATIENT)
Dept: PRIMARY CARE | Facility: CLINIC | Age: 54
End: 2024-07-23
Payer: COMMERCIAL

## 2024-07-23 RX ORDER — FLUOXETINE HYDROCHLORIDE 20 MG/1
1 CAPSULE ORAL
COMMUNITY
Start: 2024-07-18

## 2024-07-23 ASSESSMENT — PATIENT HEALTH QUESTIONNAIRE - PHQ9
1. LITTLE INTEREST OR PLEASURE IN DOING THINGS: NOT AT ALL
2. FEELING DOWN, DEPRESSED OR HOPELESS: NOT AT ALL
SUM OF ALL RESPONSES TO PHQ9 QUESTIONS 1 AND 2: 0

## 2024-07-26 ENCOUNTER — OFFICE VISIT (OUTPATIENT)
Dept: PRIMARY CARE | Facility: CLINIC | Age: 54
End: 2024-07-26
Payer: COMMERCIAL

## 2024-07-26 VITALS
SYSTOLIC BLOOD PRESSURE: 102 MMHG | DIASTOLIC BLOOD PRESSURE: 68 MMHG | WEIGHT: 142.6 LBS | HEART RATE: 76 BPM | BODY MASS INDEX: 23.02 KG/M2

## 2024-07-26 DIAGNOSIS — R20.9 COLD SENSATION OF SKIN: Primary | ICD-10-CM

## 2024-07-26 PROBLEM — G57.90 NEUROPATHY OF LOWER EXTREMITY: Status: ACTIVE | Noted: 2023-11-09

## 2024-07-26 PROBLEM — B49 INFECTION DUE TO FUNGUS: Status: ACTIVE | Noted: 2024-07-26

## 2024-07-26 PROBLEM — D17.21 LIPOMA OF RIGHT UPPER EXTREMITY: Status: ACTIVE | Noted: 2024-07-26

## 2024-07-26 PROBLEM — M46.26 INFECTION OF LUMBAR SPINE (MULTI): Status: ACTIVE | Noted: 2024-07-26

## 2024-07-26 PROBLEM — G47.00 INSOMNIA: Status: ACTIVE | Noted: 2024-07-26

## 2024-07-26 PROBLEM — F29 PSYCHOSIS (MULTI): Status: ACTIVE | Noted: 2024-07-26

## 2024-07-26 PROBLEM — G25.81 RESTLESS LEGS SYNDROME: Status: ACTIVE | Noted: 2024-07-26

## 2024-07-26 PROCEDURE — 99213 OFFICE O/P EST LOW 20 MIN: CPT | Performed by: FAMILY MEDICINE

## 2024-07-26 RX ORDER — TEMAZEPAM 7.5 MG/1
7.5 CAPSULE ORAL NIGHTLY PRN
COMMUNITY
Start: 2024-05-28 | End: 2024-07-26 | Stop reason: ALTCHOICE

## 2024-07-26 RX ORDER — DIVALPROEX SODIUM 250 MG/1
1 TABLET, DELAYED RELEASE ORAL
COMMUNITY
Start: 2024-01-15 | End: 2024-07-26 | Stop reason: ALTCHOICE

## 2024-07-26 ASSESSMENT — PAIN SCALES - GENERAL: PAINLEVEL: 0-NO PAIN

## 2024-07-26 NOTE — PROGRESS NOTES
Subjective   Patient ID: Caryn Streeetr is a 54 y.o. female who presents for Dizziness, Cold sensation in arms, and Extremity Weakness.    HPI   When she wakes from sleeping she has episodes of cold sensation on the back of the arms and some vertigo. Lasts about 12 seconds and then resolves. Happens within 30 minutes of waking up.   Not associated with exercise. Occasionally is awoken from sleep with these. This is before any meds.   BP has been low at home, sometimes around 90 systolic. She has seen cardiologist, who told her to drink more water and eat salt.   Has some cold feet and arm tingling at night sometimes.  She started jogging and that helps.   She has had a workup with neurology and she states they did not find anything.    Review of Systems    Objective   /68   Pulse 76   Wt 64.7 kg (142 lb 9.6 oz)   BMI 23.02 kg/m²     Physical Exam  Constitutional:       Appearance: She is not ill-appearing.   Cardiovascular:      Rate and Rhythm: Normal rate.      Heart sounds: No murmur heard.  Pulmonary:      Effort: Pulmonary effort is normal.   Neurological:      Mental Status: She is alert.      Coordination: Coordination normal.      Gait: Gait normal.   Psychiatric:         Mood and Affect: Mood normal.         Behavior: Behavior normal.         Assessment/Plan   Diagnoses and all orders for this visit:  Cold sensation of skin  Tolerated the 4 energy brooke.  I want her to do these every day for 5 minutes each.  I want her to let me know how she does over the next week.  Her workup so far has been negative so we will see if we can help energetically and see if that can resolve this.

## 2024-07-26 NOTE — PATIENT INSTRUCTIONS
"I recommend doing the 4 Energy Rossi every day for 4-5 minutes each. You can find these at [employer] under \"Energy Rossi\".   You can do the jogging, which seems to help.   Let's see how you do this next week with the cold sensation and your BP.   "

## 2024-09-06 DIAGNOSIS — J45.20 MILD INTERMITTENT ASTHMA WITHOUT COMPLICATION (HHS-HCC): ICD-10-CM

## 2024-09-06 RX ORDER — ALBUTEROL SULFATE 90 UG/1
2 INHALANT RESPIRATORY (INHALATION) EVERY 4 HOURS PRN
Qty: 6.7 G | Refills: 0 | Status: SHIPPED | OUTPATIENT
Start: 2024-09-06

## 2024-10-07 DIAGNOSIS — J45.20 MILD INTERMITTENT ASTHMA WITHOUT COMPLICATION (HHS-HCC): ICD-10-CM

## 2024-10-07 NOTE — TELEPHONE ENCOUNTER
Last OV 7/8  No future OV scheduled  Last refill 9/6 - no refills - left message with patient to see how often she is taking and if refill is needed.

## 2024-10-16 ENCOUNTER — TELEPHONE (OUTPATIENT)
Dept: PRIMARY CARE | Facility: CLINIC | Age: 54
End: 2024-10-16
Payer: COMMERCIAL

## 2024-10-16 NOTE — TELEPHONE ENCOUNTER
Patient states she mostly uses this at night but she likes to have one by her bed and one in her purse.

## 2024-10-16 NOTE — TELEPHONE ENCOUNTER
Patient is asking for a prescription grade vitamin.  She said that it would help with the cost since she is on disability.

## 2024-10-17 RX ORDER — ALBUTEROL SULFATE 90 UG/1
2 INHALANT RESPIRATORY (INHALATION) EVERY 4 HOURS PRN
Qty: 6.7 G | Refills: 0 | Status: SHIPPED | OUTPATIENT
Start: 2024-10-17

## 2024-10-17 NOTE — TELEPHONE ENCOUNTER
Patient takes calcium, magnesium, zinc, vitamin D together and would like these sent in by RX-whatever the recommended dosing is she is agreeable to get that sent in. Also she is wanting a supplement to help her focus.

## 2025-02-07 ENCOUNTER — APPOINTMENT (OUTPATIENT)
Dept: PRIMARY CARE | Facility: CLINIC | Age: 55
End: 2025-02-07
Payer: COMMERCIAL

## 2025-04-14 DIAGNOSIS — J45.20 MILD INTERMITTENT ASTHMA WITHOUT COMPLICATION (HHS-HCC): ICD-10-CM

## 2025-04-14 RX ORDER — ALBUTEROL SULFATE 90 UG/1
2 INHALANT RESPIRATORY (INHALATION) EVERY 4 HOURS PRN
Qty: 6.7 G | Refills: 0 | Status: SHIPPED | OUTPATIENT
Start: 2025-04-14

## 2025-05-29 DIAGNOSIS — J45.20 MILD INTERMITTENT ASTHMA WITHOUT COMPLICATION (HHS-HCC): ICD-10-CM

## 2025-05-30 NOTE — TELEPHONE ENCOUNTER
LOV:  7/26/24         NEXT OV:    None scheduled                        LAST FILL:   4/14/25                        LABS:

## 2025-06-02 RX ORDER — ALBUTEROL SULFATE 90 UG/1
2 INHALANT RESPIRATORY (INHALATION) EVERY 4 HOURS PRN
Qty: 6.7 G | Refills: 0 | Status: SHIPPED | OUTPATIENT
Start: 2025-06-02